# Patient Record
Sex: FEMALE | URBAN - METROPOLITAN AREA
[De-identification: names, ages, dates, MRNs, and addresses within clinical notes are randomized per-mention and may not be internally consistent; named-entity substitution may affect disease eponyms.]

---

## 2020-08-24 ENCOUNTER — TELEPHONE (OUTPATIENT)
Dept: BARIATRICS/WEIGHT MGMT | Age: 60
End: 2020-08-24

## 2020-08-24 NOTE — TELEPHONE ENCOUNTER
Online Info Session Completed:  on 8/21/20 okay to schedule with Dr. Blackman Friend   with [facility]    Patient informed the following: This is NOT a guarantee of payment  When stating that you have a Benefit or Coverage for Bariatric Surgery - that means that you may qualify for the surgery  Bariatric Surgery is considered an elective procedure, patient is responsible to know their benefits . Any information we obtain when calling your insurance  is not  a guarantee of  coverage  and/or  benefit. Appointment Note :   New Patient , [address],   6   month visits,  PG Fee $200,  Mailed Packet or advised to arrive  early      Remind Patient of $200 Program fee with $ 100 required at Second visit with office on initial dietician visit. Remind Patient they must be nicotine free. They will be tested at the beginning of the program and prior to surgery. Advise Patient Responsible for out of pocket, copay at medical visits, Deductible and coinsurance applied to medical visits and procedure. You will be responsible for any of the following:  · Copays   · Deductibles   · Co insurances     The items mentioned above are indicated or required by your insurance plan. Your deductible and coinsurance are applied to medical visits and procedures. Verified with patient if he or she has had any previous bariatric surgery? no  ( If yes ,advise patient of transfer of care process and program fee)       .

## 2023-08-23 PROBLEM — I10 HYPERTENSION: Status: ACTIVE | Noted: 2023-08-23

## 2023-08-23 PROBLEM — E66.9 CLASS 2 OBESITY WITH BODY MASS INDEX (BMI) OF 39.0 TO 39.9 IN ADULT: Status: ACTIVE | Noted: 2023-08-23

## 2023-08-23 PROBLEM — R94.31 ABNORMAL EKG: Status: ACTIVE | Noted: 2023-08-23

## 2023-08-23 PROBLEM — E66.812 CLASS 2 OBESITY WITH BODY MASS INDEX (BMI) OF 39.0 TO 39.9 IN ADULT: Status: ACTIVE | Noted: 2023-08-23

## 2023-08-23 PROBLEM — E78.5 HYPERLIPEMIA: Status: ACTIVE | Noted: 2023-08-23

## 2023-08-23 PROBLEM — R93.1 ECHOCARDIOGRAM ABNORMAL: Status: ACTIVE | Noted: 2023-08-23

## 2023-08-23 RX ORDER — ACETAMINOPHEN 500 MG
1 TABLET ORAL DAILY
COMMUNITY

## 2023-08-23 RX ORDER — LEVOTHYROXINE SODIUM 125 UG/1
1 TABLET ORAL DAILY
COMMUNITY

## 2023-08-23 RX ORDER — LOSARTAN POTASSIUM 50 MG/1
1 TABLET ORAL DAILY
COMMUNITY
Start: 2021-10-05

## 2023-08-23 RX ORDER — CHLORTHALIDONE 25 MG/1
1 TABLET ORAL DAILY
COMMUNITY
Start: 2021-10-05

## 2023-08-23 RX ORDER — ASPIRIN 325 MG
1 TABLET ORAL DAILY
COMMUNITY
End: 2023-10-03

## 2023-08-23 RX ORDER — ROSUVASTATIN CALCIUM 5 MG/1
1 TABLET, COATED ORAL NIGHTLY
COMMUNITY
Start: 2021-10-05

## 2023-08-23 RX ORDER — SPIRONOLACTONE 25 MG/1
1 TABLET ORAL DAILY
COMMUNITY
Start: 2021-10-05

## 2023-10-03 ENCOUNTER — OFFICE VISIT (OUTPATIENT)
Dept: CARDIOLOGY | Facility: CLINIC | Age: 63
End: 2023-10-03
Payer: COMMERCIAL

## 2023-10-03 VITALS
HEIGHT: 66 IN | DIASTOLIC BLOOD PRESSURE: 70 MMHG | SYSTOLIC BLOOD PRESSURE: 130 MMHG | WEIGHT: 240 LBS | HEART RATE: 60 BPM | BODY MASS INDEX: 38.57 KG/M2

## 2023-10-03 DIAGNOSIS — E66.01 CLASS 2 SEVERE OBESITY DUE TO EXCESS CALORIES WITH SERIOUS COMORBIDITY AND BODY MASS INDEX (BMI) OF 39.0 TO 39.9 IN ADULT (MULTI): ICD-10-CM

## 2023-10-03 DIAGNOSIS — I10 PRIMARY HYPERTENSION: ICD-10-CM

## 2023-10-03 DIAGNOSIS — E78.2 MIXED HYPERLIPIDEMIA: Primary | ICD-10-CM

## 2023-10-03 PROCEDURE — 1036F TOBACCO NON-USER: CPT | Performed by: INTERNAL MEDICINE

## 2023-10-03 PROCEDURE — 3008F BODY MASS INDEX DOCD: CPT | Performed by: INTERNAL MEDICINE

## 2023-10-03 PROCEDURE — 99213 OFFICE O/P EST LOW 20 MIN: CPT | Performed by: INTERNAL MEDICINE

## 2023-10-03 PROCEDURE — 3075F SYST BP GE 130 - 139MM HG: CPT | Performed by: INTERNAL MEDICINE

## 2023-10-03 PROCEDURE — 3078F DIAST BP <80 MM HG: CPT | Performed by: INTERNAL MEDICINE

## 2023-10-03 ASSESSMENT — PATIENT HEALTH QUESTIONNAIRE - PHQ9
1. LITTLE INTEREST OR PLEASURE IN DOING THINGS: NEARLY EVERY DAY
5. POOR APPETITE OR OVEREATING: NOT AT ALL
SUM OF ALL RESPONSES TO PHQ9 QUESTIONS 1 AND 2: 6
8. MOVING OR SPEAKING SO SLOWLY THAT OTHER PEOPLE COULD HAVE NOTICED. OR THE OPPOSITE, BEING SO FIGETY OR RESTLESS THAT YOU HAVE BEEN MOVING AROUND A LOT MORE THAN USUAL: NOT AT ALL
SUM OF ALL RESPONSES TO PHQ QUESTIONS 1-9: 6
2. FEELING DOWN, DEPRESSED OR HOPELESS: NEARLY EVERY DAY
4. FEELING TIRED OR HAVING LITTLE ENERGY: NOT AT ALL
6. FEELING BAD ABOUT YOURSELF - OR THAT YOU ARE A FAILURE OR HAVE LET YOURSELF OR YOUR FAMILY DOWN: NOT AT ALL
3. TROUBLE FALLING OR STAYING ASLEEP OR SLEEPING TOO MUCH: NOT AT ALL
9. THOUGHTS THAT YOU WOULD BE BETTER OFF DEAD, OR OF HURTING YOURSELF: NOT AT ALL
7. TROUBLE CONCENTRATING ON THINGS, SUCH AS READING THE NEWSPAPER OR WATCHING TELEVISION: NOT AT ALL
10. IF YOU CHECKED OFF ANY PROBLEMS, HOW DIFFICULT HAVE THESE PROBLEMS MADE IT FOR YOU TO DO YOUR WORK, TAKE CARE OF THINGS AT HOME, OR GET ALONG WITH OTHER PEOPLE: SOMEWHAT DIFFICULT

## 2023-10-03 NOTE — PROGRESS NOTES
Subjective   Birgit Hu is a 63 y.o. female.    Chief Complaint:  Follow-up (1 year)    HPI  Patient returns in follow-up of hypertension.  This was the reason for consultation.  The implementation of an aggressive regimen combining chlorthalidone and spironolactone appear to be effective at restoring normotensive status.  She is also on losartan.  Doing well in this regard.  Recently she stopped taking her statin therapy for unknown reasons.  She had lab work done recently by her PCP and she has an appointment with him tomorrow.  We will defer management of statins and lipids to the PCP    We note that she is on aspirin therapy.  I believe it could be discontinued because she has no diabetes, no vascular disease, no cardiovascular events.  The reason and rationale for this was explained and she understands.    Review of Systems   All other systems reviewed and are negative.      Objective   Constitutional:       Appearance: Healthy appearance. Not in distress.   Neck:      Vascular: No carotid bruit or JVR. JVD normal.   Pulmonary:      Effort: Pulmonary effort is normal.      Breath sounds: Normal breath sounds. No wheezing. No rhonchi. No rales.   Chest:      Chest wall: Not tender to palpatation.   Cardiovascular:      PMI at left midclavicular line. Normal rate. Regular rhythm. Normal S1. Normal S2.       Murmurs: There is no murmur.      No gallop.  No click. No rub.   Pulses:     Intact distal pulses.   Edema:     Peripheral edema absent.   Abdominal:      General: Bowel sounds are normal.      Palpations: Abdomen is soft.      Tenderness: There is no abdominal tenderness.   Musculoskeletal: Normal range of motion.         General: No tenderness. Skin:     General: Skin is warm and dry.   Neurological:      General: No focal deficit present.      Mental Status: Alert and oriented to person, place and time.         Lab Review:   No visits with results within 2 Month(s) from this visit.   Latest known  visit with results is:   No results found for any previous visit.       Assessment/Plan   There were no encounter diagnoses.  1. Mixed hyperlipidemia        2. Primary hypertension        3. Class 2 severe obesity due to excess calories with serious comorbidity and body mass index (BMI) of 39.0 to 39.9 in adult (CMS/[facility])

## 2024-01-25 DIAGNOSIS — I10 PRIMARY HYPERTENSION: ICD-10-CM

## 2024-01-25 RX ORDER — ATENOLOL 25 MG/1
25 TABLET ORAL DAILY
Qty: 90 TABLET | Refills: 3 | Status: SHIPPED | OUTPATIENT
Start: 2024-01-25 | End: 2025-01-24

## 2024-01-25 NOTE — TELEPHONE ENCOUNTER
----- Message from Birgit Hu sent at 1/25/2024  8:18 AM EST -----  Regarding: Dr Sparks wants me to see DR Sampson sooner: High BP  Contact: [phone]  That sounds great, when will the  new Rx be sent to [facility] pharmacy? Thank you!

## 2024-01-25 NOTE — TELEPHONE ENCOUNTER
Spoke with patient via portal. Advised she was in agreement. Rx pended to Dr. Terrence Sampson MD for authorization.

## 2024-02-01 ENCOUNTER — OFFICE VISIT (OUTPATIENT)
Dept: CARDIOLOGY | Facility: CLINIC | Age: 64
End: 2024-02-01
Payer: COMMERCIAL

## 2024-02-01 VITALS
HEIGHT: 66 IN | HEART RATE: 52 BPM | WEIGHT: 233 LBS | BODY MASS INDEX: 37.45 KG/M2 | SYSTOLIC BLOOD PRESSURE: 120 MMHG | DIASTOLIC BLOOD PRESSURE: 60 MMHG

## 2024-02-01 DIAGNOSIS — I10 PRIMARY HYPERTENSION: ICD-10-CM

## 2024-02-01 DIAGNOSIS — E78.2 MIXED HYPERLIPIDEMIA: Primary | ICD-10-CM

## 2024-02-01 DIAGNOSIS — E11.9 TYPE 2 DIABETES MELLITUS WITHOUT COMPLICATION, WITHOUT LONG-TERM CURRENT USE OF INSULIN (MULTI): ICD-10-CM

## 2024-02-01 PROBLEM — E66.9 CLASS 2 OBESITY WITH BODY MASS INDEX (BMI) OF 39.0 TO 39.9 IN ADULT: Status: RESOLVED | Noted: 2023-08-23 | Resolved: 2024-02-01

## 2024-02-01 PROBLEM — E66.812 CLASS 2 OBESITY WITH BODY MASS INDEX (BMI) OF 39.0 TO 39.9 IN ADULT: Status: RESOLVED | Noted: 2023-08-23 | Resolved: 2024-02-01

## 2024-02-01 PROCEDURE — 1036F TOBACCO NON-USER: CPT | Performed by: NURSE PRACTITIONER

## 2024-02-01 PROCEDURE — 4010F ACE/ARB THERAPY RXD/TAKEN: CPT | Performed by: NURSE PRACTITIONER

## 2024-02-01 PROCEDURE — 99212 OFFICE O/P EST SF 10 MIN: CPT | Performed by: NURSE PRACTITIONER

## 2024-02-01 PROCEDURE — 3008F BODY MASS INDEX DOCD: CPT | Performed by: NURSE PRACTITIONER

## 2024-02-01 PROCEDURE — 3074F SYST BP LT 130 MM HG: CPT | Performed by: NURSE PRACTITIONER

## 2024-02-01 PROCEDURE — 3078F DIAST BP <80 MM HG: CPT | Performed by: NURSE PRACTITIONER

## 2024-02-01 NOTE — PROGRESS NOTES
"Subjective:   Birgit Hu is a 63 y.o. female with hypertension.  Patient called into the office approximately 3 weeks ago reporting elevated blood pressure.  Atenolol 25 mg was added to medical regimen.  She has been compliant with changes.  Denies any type of side effects.  She continues to take her blood pressure multiple times throughout the day.  Was educated to take 2 hours after morning medication.  Blood pressure in the office is optimal today.    Otherwise, patient denies any change in overall cardiovascular status since last evaluation in clinic.    Current Outpatient Medications   Medication Sig Dispense Refill    atenolol (Tenormin) 25 mg tablet Take 1 tablet (25 mg) by mouth once daily. 90 tablet 3    chlorthalidone (Hygroton) 25 mg tablet Take 1 tablet (25 mg) by mouth once daily.      levothyroxine (Synthroid) 125 mcg tablet Take 1 tablet (125 mcg) by mouth once daily.      losartan (Cozaar) 50 mg tablet Take 1 tablet (50 mg) by mouth once daily.      melatonin 5 mg tablet Take 1 tablet (5 mg) by mouth once daily.      rosuvastatin (Crestor) 5 mg tablet Take 1 tablet (5 mg) by mouth once daily at bedtime.      spironolactone (Aldactone) 25 mg tablet Take 1 tablet (25 mg) by mouth once daily.       No current facility-administered medications for this visit.      Hypertension ROS: taking medications as instructed, no medication side effects noted, no TIA's, no chest pain on exertion, no dyspnea on exertion, and no swelling of ankles.   New concerns: None.     Objective:   /60 (BP Location: Left arm, Patient Position: Sitting)   Pulse 52   Ht 1.676 m (5' 6\")   Wt 106 kg (233 lb)   BMI 37.61 kg/m²    Appearance alert, well appearing, and in no distress.  General exam BP noted to be well controlled today in office, S1, S2 normal, no gallop, no murmur, chest clear, no JVD, no HSM, no edema.   Lab review: no lab studies available for review at time of visit.     Assessment:    Hypertension " well controlled.     Plan:   Current treatment plan is effective, no change in therapy.  Reviewed diet, exercise and weight control.  Recommended sodium restriction.    As needed follow-up at [facility] as per prior.    Adela White  MSN, APRN-CNP, PMHNP-BC  [facility]    Please excuse any errors in grammar or translation related to this dictation. Voice recognition software was utilized to prepare this document.

## 2024-02-01 NOTE — PATIENT INSTRUCTIONS
Please bring all medicines, vitamins, and herbal supplements with you when you come to the office.    Prescriptions will not be filled unless you are compliant with your follow up appointments or have a follow up appointment scheduled as per instruction of your physician. Refills should be requested at the time of your visit.     PLAN:   Through informed decision making process incorporating patients unique circumstances, the following treatment plan will be initiated:    1.  Prescription drug management of cardiovascular medication for efficacy, adherence to treatment, side effect assessment and polypharmacy. Current treatment clinically warranted and to continue without modifications.    2. Return for follow-up; in the interim, contact the office if new symptoms arise.  [facility] as needed

## 2024-03-14 ENCOUNTER — HOSPITAL ENCOUNTER (OUTPATIENT)
Dept: DIABETES SERVICES | Age: 64
Setting detail: THERAPIES SERIES
Discharge: HOME | End: 2024-03-14
Payer: COMMERCIAL

## 2024-03-14 PROCEDURE — G0108 DIAB MANAGE TRN  PER INDIV: HCPCS

## 2024-03-14 SDOH — ECONOMIC STABILITY: FOOD INSECURITY: ADDITIONAL INFORMATION: YES

## 2024-03-14 ASSESSMENT — PROBLEM AREAS IN DIABETES QUESTIONNAIRE (PAID)
WORRYING ABOUT THE FUTURE AND THE POSSIBILITY OF SERIOUS COMPLICATIONS: 2
FEELING THAT DIABETES IS TAKING UP TOO MUCH OF YOUR MENTAL AND PHYSICAL ENERGY EVERY DAY: 0
FEELING DEPRESSED WHEN YOU THINK ABOUT LIVING WITH DIABETES: 0
PAID-5 TOTAL SCORE: 2
COPING WITH COMPLICATIONS OF DIABETES: 0
FEELING SCARED WHEN YOU THINK ABOUT LIVING WITH DIABETES: 0

## 2024-03-14 ASSESSMENT — SLEEP AND FATIGUE QUESTIONNAIRES
HOW DO YOU RATE THE QUALITY OF YOUR SLEEP: GOOD
HAVE YOU EVER BEEN TESTED FOR SLEEP APNEA: YES
HAVE YOU BEEN TOLD, OR NOTICED ON YOUR OWN, THAT YOU STOP BREATHING OR STRUGGLE TO BREATHE IN YOUR SLEEP: NO
HOW MANY HOURS OF SLEEP ARE YOU GETTING, ON AVERAGE: LESS THAN 7

## 2024-03-14 NOTE — PROGRESS NOTES
Medicine - onset, peak and duration of each type    [] Demonstrates Competency      [] Education provided      [] Proper storage:     [] Demonstrates Competency      [] Education provided          [] Site Management        [] Demonstrates Competency        [] Education provided      [] Injection site currently uses:     [] No evidence of:        [] bruising       [] infection       [] lipoatrophy        [] lipohypertrophy.      [] Not injecting near umbilicus or scars/tattoos    [] Rotates sites appropriately          [] Injection Procedure:       [] Demonstrates Competency        [] Education provided   [] Sterile Technique   [] Rolling to uniformity (if necessary)   [] Pre-injection priming (pen only)   [] Air into vial (Syringe only)   [] Correct order for mixing in same syringe (if necessary)   [] Dosing accuracy   [] Needle insertion   [] Needle withdrawal - with waiting time before removing needle          [] Disposal Procedure:       [] Demonstrates Competency        [] Education provided     [] Injection Device Selection:       [] Demonstrates Competency       [] Education provided        [] Suited to physical limitations           [] Injection adherence:       [] Demonstrates Competency        [] Education provided     [] Misses doses:         [] Daily [] Weekly          [] Monthly [] Almost Never           Nasrin Manzo RN   [] Discussed insulin stigma and proper technique including site selection and self-injection.     []  Reviewed both pen and vial/syringe use. Discussed needle disposal and proper insulin storage and importance of times to take insulin.      [] Discussed importance of blood glucose monitoring to assess current treatment effectiveness. Evaluation rating:  []1  []2  []3  [] 4  []NA [] NC    On Insulin?   []Yes []No           Injection site used: __________     Rotating sites?   []Yes [] No     Problems?   []Yes []No      Storing insulin correctly?   []Yes [] No     Injecting at proper

## 2024-04-03 ENCOUNTER — TELEPHONE (OUTPATIENT)
Dept: DIABETES SERVICES | Age: 64
End: 2024-04-03

## 2024-04-03 NOTE — PROGRESS NOTES
Momixhf2Tcsud- Miguelina         [] SparkQuote (Free, inspiring quote of the day)    []  Healthy Eating  [] Weight Management & Carb Counting  [] Weight Yhnecttd-[phone]; www.weightwaBounce Imaging.IDRI (Infectious Disease Research Institute)  [] Over Eaters Hzvsxhclx-299-367-2664 (support group)- www.[employer].org  [] Follow up individual appointment with [facility] dietitian - call [phone]  [] Food Tracking Apps - My Fitness Pal, Calorie Deepak  [] ADA website - Recipes & Nutrition info, weight loss  [] [employer].com      []   Monitoring  [] Glucose Lalo (Free, tracks blood glucose, graphs)  [] MySugrApp (Basic and Pro patterns)  [] Diabetes:M - logbook    []  Being Active  [] 24 Hour Gkyhfxv-117-324-0240; www.mySupermarket  [] Planet Fitness www.[employer]  [] [facility] [employer] [place] - [phone]  [] Fit Walks: FREE  [facility] in [place] on Mondays 5:30 pm  [facility] [employer] [place] in [place] (for weather)   [facility] on Thursdays at 5:30 [phone]  [] Brionna Ghotra walking videos (Some free on youtube)  [] Other Exercise Plan/Facility _____________________________________________  [] Apps Couch to 5K    []  Reducing Risk  [] Make a sick day toolkit  [] Schedule appointments for eye, dentist  [] Stop smoking              [] Monitor Blood pressure              [] Get vaccinated                [] Other:___________________      []    Taking Medications  [] Miguelina: MyTherapyApp - helps track meds  [] Seek financial help with medications:  [] Non-insulin-agents-cost-saving-resource-7-29-19.pdf   https://www.diabeteseducator.org/docs/default-source/practice/educator-tools/non-insulin-agents-cost-saving-resource-7-29-19.pdf?sfvrsn=2  [] Insulin cost saving resource  https://www.diabeteseducator.org/docs/default-source/practice/educator-tools/insulin-cost-saving-resources-3-4-19.pdf?sfvrsn=4  []  GetInsulin.org  []  The Affordable insulin Project http://affordableinsulinproject.org/    []  Diabetes Websites - Use as a resource

## 2024-07-24 DIAGNOSIS — I10 HYPERTENSION, UNSPECIFIED TYPE: ICD-10-CM

## 2024-07-25 RX ORDER — CHLORTHALIDONE 25 MG/1
25 TABLET ORAL DAILY
Qty: 90 TABLET | Refills: 1 | Status: SHIPPED | OUTPATIENT
Start: 2024-07-25

## 2024-08-13 ENCOUNTER — PATIENT OUTREACH (OUTPATIENT)
Dept: CARDIOLOGY | Facility: CLINIC | Age: 64
End: 2024-08-13
Payer: COMMERCIAL

## 2024-08-13 NOTE — PROGRESS NOTES
Discharge Facility:  [facility]  Discharge Diagnosis:  Symptomatic Bradycardia  Admission Date:  8/8/24  Discharge Date: 8/9/24    PCP Appointment Date: no appt listed  Specialist Appointment Date: Dr Warren 8/26/24--sending task for sooner appt  Hospital Encounter and Summary Linked: Yes    Two attempts were made to reach patient within two business days after discharge. Voicemail left with contact information for patient to call back with any non-emergent questions or concerns.

## 2024-08-14 ENCOUNTER — TELEPHONE (OUTPATIENT)
Dept: CARDIOLOGY | Facility: CLINIC | Age: 64
End: 2024-08-14
Payer: COMMERCIAL

## 2024-08-14 NOTE — TELEPHONE ENCOUNTER
TCM appt has been made with Dr Warren for 8/19 at 11am. Called patient and left message with this appt date and time.  
Communicate Risk of Fall with Harm to all staff/Reinforce activity limits and safety measures with patient and family/Tailored Fall Risk Interventions/Visual Cue: Yellow wristband and red socks/Bed in lowest position, wheels locked, appropriate side rails in place/Call bell, personal items and telephone in reach/Instruct patient to call for assistance before getting out of bed or chair/Non-slip footwear when patient is out of bed/[place] to call system/Physically safe environment - no spills, clutter or unnecessary equipment/Purposeful Proactive Rounding/Room/bathroom lighting operational, light cord in reach

## 2024-08-19 ENCOUNTER — APPOINTMENT (OUTPATIENT)
Dept: CARDIOLOGY | Facility: CLINIC | Age: 64
End: 2024-08-19
Payer: COMMERCIAL

## 2024-08-19 VITALS
WEIGHT: 241 LBS | HEART RATE: 60 BPM | SYSTOLIC BLOOD PRESSURE: 138 MMHG | BODY MASS INDEX: 38.73 KG/M2 | HEIGHT: 66 IN | DIASTOLIC BLOOD PRESSURE: 68 MMHG

## 2024-08-19 DIAGNOSIS — Z79.899 MEDICATION COURSE CHANGED: Primary | ICD-10-CM

## 2024-08-19 DIAGNOSIS — E11.9 DIABETES MELLITUS TYPE II, NON INSULIN DEPENDENT (MULTI): ICD-10-CM

## 2024-08-19 DIAGNOSIS — E78.2 MIXED HYPERLIPIDEMIA: ICD-10-CM

## 2024-08-19 DIAGNOSIS — I10 PRIMARY HYPERTENSION: ICD-10-CM

## 2024-08-19 DIAGNOSIS — Z78.9 NEVER SMOKED CIGARETTES: ICD-10-CM

## 2024-08-19 DIAGNOSIS — E03.9 HYPOTHYROIDISM, UNSPECIFIED TYPE: ICD-10-CM

## 2024-08-19 DIAGNOSIS — Z09 HOSPITAL DISCHARGE FOLLOW-UP: ICD-10-CM

## 2024-08-19 PROCEDURE — 3078F DIAST BP <80 MM HG: CPT | Performed by: INTERNAL MEDICINE

## 2024-08-19 PROCEDURE — 4010F ACE/ARB THERAPY RXD/TAKEN: CPT | Performed by: INTERNAL MEDICINE

## 2024-08-19 PROCEDURE — 3075F SYST BP GE 130 - 139MM HG: CPT | Performed by: INTERNAL MEDICINE

## 2024-08-19 PROCEDURE — 99495 TRANSJ CARE MGMT MOD F2F 14D: CPT | Performed by: INTERNAL MEDICINE

## 2024-08-19 PROCEDURE — 3008F BODY MASS INDEX DOCD: CPT | Performed by: INTERNAL MEDICINE

## 2024-08-19 RX ORDER — ROSUVASTATIN CALCIUM 20 MG/1
20 TABLET, COATED ORAL DAILY
Qty: 90 TABLET | Refills: 3 | Status: SHIPPED | OUTPATIENT
Start: 2024-08-19 | End: 2024-08-21

## 2024-08-19 RX ORDER — GLYBURIDE 2.5 MG/1
2.5 TABLET ORAL DAILY
COMMUNITY
Start: 2024-02-16

## 2024-08-19 RX ORDER — NORETHINDRONE 0.35 MG/1
1 TABLET ORAL DAILY
COMMUNITY
Start: 2024-02-16

## 2024-08-19 RX ORDER — ASPIRIN 81 MG/1
81 TABLET ORAL DAILY
COMMUNITY
Start: 2024-08-08

## 2024-08-19 NOTE — LETTER
August 20, 2024     Aftab Flores DO  [address]  [place]  [place] [address]    Patient: Birgit Hu   YOB: 1960   Date of Visit: 8/19/2024       Dear Dr. Aftab Flores, DO:    Thank you for referring Birgit Hu to me for evaluation. Below are my notes for this consultation.  If you have questions, please do not hesitate to call me. I look forward to following your patient along with you.       Sincerely,     Rachana Warren MD      CC: No Recipients  ______________________________________________________________________________________    Patient is new to this provider.  Previously seen by Dr. Gracia.  I have reviewed his note from October 2023.  Also reviewed notes by Adela White NP dated February 2024.  This is a hospital discharge follow-up.    Subjective :   Presented with profound fatigue shortness of breath and lightheadedness, heart rates were reading in the 40s and 30s, atenolol was discontinued, heart rate has improved and her symptoms have also resolved  She reports feeling presyncopal when she had low heart rates.  Denies chest pressure tightness heaviness or palpitations.  Thyroid issues are handled by Dr. Flores.  In the hospital she was switched from losartan to irbesartan, she felt very fatigued on the irbesartan, she is back on losartan, and her symptoms have improved.  She is a non-insulin-dependent diabetic.  Reviewed hospital notes.        History so Far :  1.  Primary hypertension  2.  Non-insulin-dependent diabetes  3.  Echocardiogram 2017: LVEF 55 to 60%, mild LVH, septal diameter 1.2 cm posterior wall diameter 1.1 cm  4.  Intolerance to atenolol-at 50 mg daily her heart rate dropped into the 30s, and she became fatigued short of breath and presyncopal  5.  Intolerance to irbesartan-fatigue  6.  Intolerance to amlodipine-leg edema  7.  Intolerance to lisinopril-cough  8.  Hypothyroidism  9.  Echocardiogram 2021 January-LVEF  "65% aortic sclerosis, trace tricuspid regurgitation, left atrial diameter 4.1 cm, RV systolic pressure 27 mmHg.      Objective   Wt Readings from Last 3 Encounters:   08/19/24 109 kg (241 lb)   02/01/24 106 kg (233 lb)   10/03/23 109 kg (240 lb)            Vitals:    08/19/24 1058   BP: 138/68   BP Location: Left arm   Patient Position: Sitting   Pulse: 60   Weight: 109 kg (241 lb)   Height: 1.676 m (5' 6\")                Physical Exam:    GENERAL APPEARANCE: in no acute distress.  CHEST: Symmetric and non-tender.  INTEGUMENT: Skin warm and dry  HEENT: No gross abnormalities identified.No pallor or scleral icterus.  NECK: Supple, no JVD, no bruit.   NEURO/PSHCY: Alert and oriented x3; appropriate behavior and responses and responses  LUNGS: Clear to auscultation bilaterally; normal respiratory effort.  HEART: Rate and rhythm regular with no evident murmur; no gallop appreciated.   ABDOMEN: Soft, non tender.  MUSCULOSKELETAL: No gross deformities.  EXTREMITIES: Warm  There is no edema noted.    Meds:  Current Outpatient Medications   Medication Instructions   • aspirin 81 mg, Daily   • chlorthalidone (HYGROTON) 25 mg, oral, Daily   • glyBURIDE (DIABETA) 2.5 mg, Daily   • levothyroxine (Synthroid) 125 mcg tablet 1 tablet, oral, Daily   • losartan (Cozaar) 50 mg tablet 1 tablet, oral, Daily   • norethindrone (Micronor) 0.35 mg tablet 1 tablet, Daily   • rosuvastatin (CRESTOR) 20 mg, oral, Daily          Allergies   Allergen Reactions   • Amlodipine Swelling     Swelling of legs   • Atenolol Other     bradycardia   • Irbesartan Other     bradycardia   • Adhesive Tape-Silicones Unknown   • Lisinopril Cough             LABS:    From 8/8/2024-BNP level 60 sodium 134 potassium 4 BUN 23 creatinine 0.86 hemoglobin 14 hematocrit 41 liver enzymes normal except minimal elevation of total bilirubin at 0.2                 Patient Active Problem List    Diagnosis Date Noted   • Hospital discharge follow-up 08/19/2024   • " Hypothyroidism 08/19/2024   • Never smoked cigarettes 08/19/2024   • BMI 37.0-37.9, adult 02/01/2024   • Diabetes mellitus type II, non insulin dependent (Multi) 02/01/2024   • Abnormal EKG 08/23/2023   • Echocardiogram abnormal 08/23/2023   • Hyperlipemia 08/23/2023   • Hypertension 08/23/2023                 Assessment:    1. Hospital discharge follow-up        2. Primary hypertension  Comprehensive Metabolic Panel    Follow Up In Cardiology    Comprehensive Metabolic Panel      3. Mixed hyperlipidemia  Lipid Panel    rosuvastatin (Crestor) 20 mg tablet    Lipid Panel      4. Diabetes mellitus type II, non insulin dependent (Multi)        5. Hypothyroidism, unspecified type        6. Never smoked cigarettes        Patient's blood pressure is actually controlled on current regimen.  She will work on weight loss and lifestyle modification.  May be a candidate for Ozempic or Wegovy.  She will discuss with Dr. Flores.  We will stay away from AV yuki blocking agents and sinus node inhibitors.  In view of her lipid numbers and underlying diabetes, we will aim for LDL of 70, increasing her rosuvastatin to 20 mg daily, talked about potential side effects and what to do if there occur.    Follow up : 6 months        Provider Attestation - Scribe documentation    All medical record entries made by the Scribe were at my direction and personally dictated by me. I have reviewed the chart and agree that the record accurately reflects my personal performance of the history, physical exam, discussion and plan.

## 2024-08-19 NOTE — PROGRESS NOTES
"Patient is new to this provider.  Previously seen by Dr. Gracia.  I have reviewed his note from October 2023.  Also reviewed notes by Adela White NP dated February 2024.  This is a hospital discharge follow-up.    Subjective :   Presented with profound fatigue shortness of breath and lightheadedness, heart rates were reading in the 40s and 30s, atenolol was discontinued, heart rate has improved and her symptoms have also resolved  She reports feeling presyncopal when she had low heart rates.  Denies chest pressure tightness heaviness or palpitations.  Thyroid issues are handled by Dr. Flores.  In the hospital she was switched from losartan to irbesartan, she felt very fatigued on the irbesartan, she is back on losartan, and her symptoms have improved.  She is a non-insulin-dependent diabetic.  Reviewed hospital notes.        History so Far :  1.  Primary hypertension  2.  Non-insulin-dependent diabetes  3.  Echocardiogram 2017: LVEF 55 to 60%, mild LVH, septal diameter 1.2 cm posterior wall diameter 1.1 cm  4.  Intolerance to atenolol-at 50 mg daily her heart rate dropped into the 30s, and she became fatigued short of breath and presyncopal  5.  Intolerance to irbesartan-fatigue  6.  Intolerance to amlodipine-leg edema  7.  Intolerance to lisinopril-cough  8.  Hypothyroidism  9.  Echocardiogram 2021 January-LVEF 65% aortic sclerosis, trace tricuspid regurgitation, left atrial diameter 4.1 cm, RV systolic pressure 27 mmHg.      Objective   Wt Readings from Last 3 Encounters:   08/19/24 109 kg (241 lb)   02/01/24 106 kg (233 lb)   10/03/23 109 kg (240 lb)            Vitals:    08/19/24 1058   BP: 138/68   BP Location: Left arm   Patient Position: Sitting   Pulse: 60   Weight: 109 kg (241 lb)   Height: 1.676 m (5' 6\")                Physical Exam:    GENERAL APPEARANCE: in no acute distress.  CHEST: Symmetric and non-tender.  INTEGUMENT: Skin warm and dry  HEENT: No gross abnormalities identified.No pallor or " scleral icterus.  NECK: Supple, no JVD, no bruit.   NEURO/PSHCY: Alert and oriented x3; appropriate behavior and responses and responses  LUNGS: Clear to auscultation bilaterally; normal respiratory effort.  HEART: Rate and rhythm regular with no evident murmur; no gallop appreciated.   ABDOMEN: Soft, non tender.  MUSCULOSKELETAL: No gross deformities.  EXTREMITIES: Warm  There is no edema noted.    Meds:  Current Outpatient Medications   Medication Instructions    aspirin 81 mg, Daily    chlorthalidone (HYGROTON) 25 mg, oral, Daily    glyBURIDE (DIABETA) 2.5 mg, Daily    levothyroxine (Synthroid) 125 mcg tablet 1 tablet, oral, Daily    losartan (Cozaar) 50 mg tablet 1 tablet, oral, Daily    norethindrone (Micronor) 0.35 mg tablet 1 tablet, Daily    rosuvastatin (CRESTOR) 20 mg, oral, Daily          Allergies   Allergen Reactions    Amlodipine Swelling     Swelling of legs    Atenolol Other     bradycardia    Irbesartan Other     bradycardia    Adhesive Tape-Silicones Unknown    Lisinopril Cough             LABS:    From 8/8/2024-BNP level 60 sodium 134 potassium 4 BUN 23 creatinine 0.86 hemoglobin 14 hematocrit 41 liver enzymes normal except minimal elevation of total bilirubin at 0.2                 Patient Active Problem List    Diagnosis Date Noted    Hospital discharge follow-up 08/19/2024    Hypothyroidism 08/19/2024    Never smoked cigarettes 08/19/2024    BMI 37.0-37.9, adult 02/01/2024    Diabetes mellitus type II, non insulin dependent (Multi) 02/01/2024    Abnormal EKG 08/23/2023    Echocardiogram abnormal 08/23/2023    Hyperlipemia 08/23/2023    Hypertension 08/23/2023                 Assessment:    1. Hospital discharge follow-up        2. Primary hypertension  Comprehensive Metabolic Panel    Follow Up In Cardiology    Comprehensive Metabolic Panel      3. Mixed hyperlipidemia  Lipid Panel    rosuvastatin (Crestor) 20 mg tablet    Lipid Panel      4. Diabetes mellitus type II, non insulin dependent  (Multi)        5. Hypothyroidism, unspecified type        6. Never smoked cigarettes        Patient's blood pressure is actually controlled on current regimen.  She will work on weight loss and lifestyle modification.  May be a candidate for Ozempic or Wegovy.  She will discuss with Dr. Flores.  We will stay away from AV yuki blocking agents and sinus node inhibitors.  In view of her lipid numbers and underlying diabetes, we will aim for LDL of 70, increasing her rosuvastatin to 20 mg daily, talked about potential side effects and what to do if there occur.    Follow up : 6 months        Provider Attestation - Scribe documentation    All medical record entries made by the Scribe were at my direction and personally dictated by me. I have reviewed the chart and agree that the record accurately reflects my personal performance of the history, physical exam, discussion and plan.

## 2024-08-19 NOTE — PATIENT INSTRUCTIONS
Please bring all medicines, vitamins, and herbal supplements with you when you come to the office.    Prescriptions will not be filled unless you are compliant with your follow up appointments or have a follow up appointment scheduled as per instruction of your physician. Refills should be requested at the time of your visit.     BMI was above normal measurement. Current weight: 109 kg (241 lb)  Weight change since last visit (-) denotes wt loss 8 lbs   Weight loss needed to achieve BMI 25: 86.4 Lbs  Weight loss needed to achieve BMI 30: 55.5 Lbs  Provided instructions on dietary changes  Provided instructions on exercise.

## 2024-08-20 DIAGNOSIS — E78.2 MIXED HYPERLIPIDEMIA: ICD-10-CM

## 2024-08-20 DIAGNOSIS — I10 HYPERTENSION, UNSPECIFIED TYPE: ICD-10-CM

## 2024-08-20 DIAGNOSIS — I10 PRIMARY HYPERTENSION: ICD-10-CM

## 2024-08-21 RX ORDER — CHLORTHALIDONE 25 MG/1
25 TABLET ORAL DAILY
Qty: 90 TABLET | Refills: 3 | Status: SHIPPED | OUTPATIENT
Start: 2024-08-21 | End: 2025-08-21

## 2024-08-21 RX ORDER — ROSUVASTATIN CALCIUM 20 MG/1
20 TABLET, COATED ORAL DAILY
Qty: 90 TABLET | Refills: 3 | Status: SHIPPED | OUTPATIENT
Start: 2024-08-21 | End: 2025-08-21

## 2024-08-21 RX ORDER — LOSARTAN POTASSIUM 50 MG/1
50 TABLET ORAL DAILY
Qty: 90 TABLET | Refills: 3 | Status: SHIPPED | OUTPATIENT
Start: 2024-08-21 | End: 2025-08-21

## 2024-08-26 ENCOUNTER — APPOINTMENT (OUTPATIENT)
Dept: CARDIOLOGY | Facility: CLINIC | Age: 64
End: 2024-08-26
Payer: COMMERCIAL

## 2024-08-27 ENCOUNTER — PATIENT OUTREACH (OUTPATIENT)
Dept: CARDIOLOGY | Facility: CLINIC | Age: 64
End: 2024-08-27
Payer: COMMERCIAL

## 2024-08-27 NOTE — PROGRESS NOTES
Call regarding appt. With Cardiology on 8/19/24 after hospitalization.  At time of outreach call the patient feels as if their condition has improved since last visit.  Reviewed the PCP appointment with the pt and addressed any questions or concerns.

## 2024-09-26 ENCOUNTER — PATIENT OUTREACH (OUTPATIENT)
Dept: CARDIOLOGY | Facility: CLINIC | Age: 64
End: 2024-09-26
Payer: COMMERCIAL

## 2024-10-08 ENCOUNTER — APPOINTMENT (OUTPATIENT)
Dept: CARDIOLOGY | Facility: CLINIC | Age: 64
End: 2024-10-08
Payer: COMMERCIAL

## 2024-10-09 ENCOUNTER — APPOINTMENT (OUTPATIENT)
Dept: CARDIOLOGY | Facility: CLINIC | Age: 64
End: 2024-10-09
Payer: COMMERCIAL

## 2024-10-15 ENCOUNTER — APPOINTMENT (OUTPATIENT)
Dept: CARDIOLOGY | Facility: CLINIC | Age: 64
End: 2024-10-15
Payer: COMMERCIAL

## 2024-10-16 ENCOUNTER — APPOINTMENT (OUTPATIENT)
Dept: CARDIOLOGY | Facility: CLINIC | Age: 64
End: 2024-10-16
Payer: COMMERCIAL

## 2024-10-24 ENCOUNTER — PATIENT OUTREACH (OUTPATIENT)
Dept: PRIMARY CARE | Facility: CLINIC | Age: 64
End: 2024-10-24
Payer: COMMERCIAL

## 2025-02-20 ENCOUNTER — APPOINTMENT (OUTPATIENT)
Dept: CARDIOLOGY | Facility: CLINIC | Age: 65
End: 2025-02-20
Payer: COMMERCIAL

## 2025-03-10 ENCOUNTER — APPOINTMENT (OUTPATIENT)
Dept: CARDIOLOGY | Facility: CLINIC | Age: 65
End: 2025-03-10
Payer: COMMERCIAL

## 2025-03-10 VITALS
DIASTOLIC BLOOD PRESSURE: 60 MMHG | BODY MASS INDEX: 34.72 KG/M2 | SYSTOLIC BLOOD PRESSURE: 116 MMHG | HEIGHT: 66 IN | HEART RATE: 60 BPM | WEIGHT: 216 LBS

## 2025-03-10 DIAGNOSIS — E03.9 HYPOTHYROIDISM, UNSPECIFIED TYPE: ICD-10-CM

## 2025-03-10 DIAGNOSIS — Z78.9 NEVER SMOKED CIGARETTES: ICD-10-CM

## 2025-03-10 DIAGNOSIS — E78.2 MIXED HYPERLIPIDEMIA: ICD-10-CM

## 2025-03-10 DIAGNOSIS — E11.9 DIABETES MELLITUS TYPE II, NON INSULIN DEPENDENT (MULTI): ICD-10-CM

## 2025-03-10 DIAGNOSIS — Z79.899 MEDICATION COURSE CHANGED: ICD-10-CM

## 2025-03-10 DIAGNOSIS — I10 PRIMARY HYPERTENSION: ICD-10-CM

## 2025-03-10 PROCEDURE — 3074F SYST BP LT 130 MM HG: CPT | Performed by: INTERNAL MEDICINE

## 2025-03-10 PROCEDURE — 3078F DIAST BP <80 MM HG: CPT | Performed by: INTERNAL MEDICINE

## 2025-03-10 PROCEDURE — 3008F BODY MASS INDEX DOCD: CPT | Performed by: INTERNAL MEDICINE

## 2025-03-10 PROCEDURE — 99214 OFFICE O/P EST MOD 30 MIN: CPT | Performed by: INTERNAL MEDICINE

## 2025-03-10 PROCEDURE — 1036F TOBACCO NON-USER: CPT | Performed by: INTERNAL MEDICINE

## 2025-03-10 PROCEDURE — 4010F ACE/ARB THERAPY RXD/TAKEN: CPT | Performed by: INTERNAL MEDICINE

## 2025-03-10 RX ORDER — PRAVASTATIN SODIUM 20 MG/1
20 TABLET ORAL DAILY
Qty: 90 TABLET | Refills: 3 | Status: SHIPPED | OUTPATIENT
Start: 2025-03-10 | End: 2026-03-10

## 2025-03-10 RX ORDER — LOSARTAN POTASSIUM 50 MG/1
50 TABLET ORAL DAILY
Qty: 90 TABLET | Refills: 3 | Status: SHIPPED | OUTPATIENT
Start: 2025-03-10 | End: 2026-03-10

## 2025-03-10 RX ORDER — CHLORTHALIDONE 25 MG/1
25 TABLET ORAL DAILY
Qty: 90 TABLET | Refills: 3 | Status: SHIPPED | OUTPATIENT
Start: 2025-03-10 | End: 2026-03-10

## 2025-03-10 NOTE — PROGRESS NOTES
"64-year-old with multiple cardiac risk factors, no established coronary artery disease, several medication intolerances, presents for follow-up.    Subjective :     Review of Systems interval review of systems is negative for chest discomfort pressure tightness heaviness palpitations lightheadedness orthopnea paroxysmal nocturnal dyspnea dependent edema or claudication TIA or CVA type symptoms or bleeding diathesis  Developed muscle aches on current dose of rosuvastatin.  She was able to tolerate 5 mg dose without issue.  12 point ROS is negative or non contributory except as noted.       History so Far :  1.  Primary hypertension  2.  Non-insulin-dependent diabetes  3.  Echocardiogram 2017: LVEF 55 to 60%, mild LVH, septal diameter 1.2 cm posterior wall diameter 1.1 cm  4.  Intolerance to atenolol-at 50 mg daily her heart rate dropped into the 30s, and she became fatigued short of breath and presyncopal  5.  Intolerance to irbesartan-fatigue  6.  Intolerance to amlodipine-leg edema  7.  Intolerance to lisinopril-cough  8.  Hypothyroidism  9.  Echocardiogram 2021 January-LVEF 65% aortic sclerosis, trace tricuspid regurgitation, left atrial diameter 4.1 cm, RV systolic pressure 27 mmHg.  10.coronary calcium score November 20 22-16, ascending thoracic aorta 3.7 cm in diameter    Objective   Wt Readings from Last 3 Encounters:   03/10/25 98 kg (216 lb)   08/19/24 109 kg (241 lb)   02/01/24 106 kg (233 lb)            Vitals:    03/10/25 1522   BP: 116/60   BP Location: Left arm   Patient Position: Sitting   Pulse: 60   Weight: 98 kg (216 lb)   Height: 1.676 m (5' 6\")                Physical Exam:    GENERAL APPEARANCE: in no acute distress.  CHEST: Symmetric and non-tender.  INTEGUMENT: Skin warm and dry  HEENT: No gross abnormalities identified.No pallor or scleral icterus.  NECK: Supple, no JVD, no bruit.   NEURO/PSHCY: Alert and oriented x3; appropriate behavior and responses and responses  LUNGS: Clear to " "auscultation bilaterally; normal respiratory effort.  HEART: Rate and rhythm regular with no evident murmur; no gallop appreciated.   ABDOMEN: Soft, non tender.  MUSCULOSKELETAL: No gross deformities.  EXTREMITIES: Warm  There is no edema noted.    Meds:  Current Outpatient Medications   Medication Instructions    aspirin 81 mg, Daily    chlorthalidone (HYGROTON) 25 mg, oral, Daily    glyBURIDE (DIABETA) 2.5 mg, Daily    levothyroxine (Synthroid) 125 mcg tablet 1 tablet, oral, Daily    losartan (COZAAR) 50 mg, oral, Daily    norethindrone (Micronor) 0.35 mg tablet 1 tablet, Daily          Allergies   Allergen Reactions    Amlodipine Swelling     Swelling of legs    Atenolol Other     bradycardia    Irbesartan Other     bradycardia    Rosuvastatin Myalgia     Able to take lower dose    Adhesive Tape-Silicones Unknown    Lisinopril Cough             LABS:    Lipids:  No results found for: \"CHOL\"  No results found for: \"HDL\"  No results found for: \"LDLCALC\"  No results found for: \"TRIG\"  No components found for: \"CHOLHDL\"  A1C  No results found for: \"HGBA1C\"  CBC        October 2024-GFR greater than 60 sodium 138 potassium 3.5 liver enzymes normal total cholesterol 139 HDL 34 triglycerides 155 LDL 74 total cholesterol to HDL ratio 4.1 TSH 3.84 hemoglobin and hematocrit 13.4 and 39.5 respectively          Patient Active Problem List    Diagnosis Date Noted    Medication course changed 03/10/2025    Hospital discharge follow-up 08/19/2024    Hypothyroidism 08/19/2024    Never smoked cigarettes 08/19/2024    Body mass index (BMI) of 34.0 to 34.9 in adult 02/01/2024    Diabetes mellitus type II, non insulin dependent (Multi) 02/01/2024    Abnormal EKG 08/23/2023    Echocardiogram abnormal 08/23/2023    Hyperlipemia 08/23/2023    Hypertension 08/23/2023                 Assessment:    1. Primary hypertension  Follow Up In Cardiology      2. Mixed hyperlipidemia        3. Diabetes mellitus type II, non insulin dependent " (Multi)        4. Hypothyroidism, unspecified type        5. Medication course changed        6. Never smoked cigarettes        7. Body mass index (BMI) of 34.0 to 34.9 in adult           Patient blood pressure is controlled on current regimen, and she feels well on this combination of medications  She has intolerance to doses of rosuvastatin higher than 5 mg.  Recent lipid profile was reviewed.  Although her coronary calcium score is less than 100, she is at the 75th percentile for age.  Will discontinue rosuvastatin and  start pravastatin 20 mg daily  Comprehensive profile and lipid profile in 3 to 4 months  Patient to call with interval issues or concerns.  Follow up : 1 year        Provider Attestation - Latia CHANG LPN Scribe documentation    All medical record entries made by the Scribe were at my direction and personally dictated by me. I have reviewed the chart and agree that the record accurately reflects my personal performance of the history, physical exam, discussion and plan.

## 2025-03-10 NOTE — PATIENT INSTRUCTIONS
Please bring all medicines, vitamins, and herbal supplements with you when you come to the office.    Prescriptions will not be filled unless you are compliant with your follow up appointments or have a follow up appointment scheduled as per instruction of your physician. Refills should be requested at the time of your visit.     BMI was above normal measurement. Current weight: 98 kg (216 lb)  Weight change since last visit (-) denotes wt loss -25 lbs   Weight loss needed to achieve BMI 25: 61.4 Lbs  Weight loss needed to achieve BMI 30: 30.5 Lbs  Provided instructions on dietary changes  Provided instructions on exercise.

## 2025-03-10 NOTE — LETTER
March 10, 2025     Aftab Flores,   [address]  [place]  [place] [address]    Patient: Birgit Hu   YOB: 1960   Date of Visit: 3/10/2025       Dear Dr. Aftab Flores, DO:    Thank you for referring Birgit Hu to me for evaluation. Below are my notes for this consultation.  If you have questions, please do not hesitate to call me. I look forward to following your patient along with you.       Sincerely,     Rachana Warren MD      CC: No Recipients  ______________________________________________________________________________________    64-year-old with multiple cardiac risk factors, no established coronary artery disease, several medication intolerances, presents for follow-up.    Subjective :     Review of Systems interval review of systems is negative for chest discomfort pressure tightness heaviness palpitations lightheadedness orthopnea paroxysmal nocturnal dyspnea dependent edema or claudication TIA or CVA type symptoms or bleeding diathesis  Developed muscle aches on current dose of rosuvastatin.  She was able to tolerate 5 mg dose without issue.  12 point ROS is negative or non contributory except as noted.       History so Far :  1.  Primary hypertension  2.  Non-insulin-dependent diabetes  3.  Echocardiogram 2017: LVEF 55 to 60%, mild LVH, septal diameter 1.2 cm posterior wall diameter 1.1 cm  4.  Intolerance to atenolol-at 50 mg daily her heart rate dropped into the 30s, and she became fatigued short of breath and presyncopal  5.  Intolerance to irbesartan-fatigue  6.  Intolerance to amlodipine-leg edema  7.  Intolerance to lisinopril-cough  8.  Hypothyroidism  9.  Echocardiogram 2021 January-LVEF 65% aortic sclerosis, trace tricuspid regurgitation, left atrial diameter 4.1 cm, RV systolic pressure 27 mmHg.  10.coronary calcium score November 20 22-16, ascending thoracic aorta 3.7 cm in diameter    Objective   Wt Readings from Last 3  "Encounters:   03/10/25 98 kg (216 lb)   08/19/24 109 kg (241 lb)   02/01/24 106 kg (233 lb)            Vitals:    03/10/25 1522   BP: 116/60   BP Location: Left arm   Patient Position: Sitting   Pulse: 60   Weight: 98 kg (216 lb)   Height: 1.676 m (5' 6\")                Physical Exam:    GENERAL APPEARANCE: in no acute distress.  CHEST: Symmetric and non-tender.  INTEGUMENT: Skin warm and dry  HEENT: No gross abnormalities identified.No pallor or scleral icterus.  NECK: Supple, no JVD, no bruit.   NEURO/PSHCY: Alert and oriented x3; appropriate behavior and responses and responses  LUNGS: Clear to auscultation bilaterally; normal respiratory effort.  HEART: Rate and rhythm regular with no evident murmur; no gallop appreciated.   ABDOMEN: Soft, non tender.  MUSCULOSKELETAL: No gross deformities.  EXTREMITIES: Warm  There is no edema noted.    Meds:  Current Outpatient Medications   Medication Instructions   • aspirin 81 mg, Daily   • chlorthalidone (HYGROTON) 25 mg, oral, Daily   • glyBURIDE (DIABETA) 2.5 mg, Daily   • levothyroxine (Synthroid) 125 mcg tablet 1 tablet, oral, Daily   • losartan (COZAAR) 50 mg, oral, Daily   • norethindrone (Micronor) 0.35 mg tablet 1 tablet, Daily          Allergies   Allergen Reactions   • Amlodipine Swelling     Swelling of legs   • Atenolol Other     bradycardia   • Irbesartan Other     bradycardia   • Rosuvastatin Myalgia     Able to take lower dose   • Adhesive Tape-Silicones Unknown   • Lisinopril Cough             LABS:    Lipids:  No results found for: \"CHOL\"  No results found for: \"HDL\"  No results found for: \"LDLCALC\"  No results found for: \"TRIG\"  No components found for: \"CHOLHDL\"  A1C  No results found for: \"HGBA1C\"  CBC        October 2024-GFR greater than 60 sodium 138 potassium 3.5 liver enzymes normal total cholesterol 139 HDL 34 triglycerides 155 LDL 74 total cholesterol to HDL ratio 4.1 TSH 3.84 hemoglobin and hematocrit 13.4 and 39.5 respectively          Patient " Active Problem List    Diagnosis Date Noted   • Medication course changed 03/10/2025   • Hospital discharge follow-up 08/19/2024   • Hypothyroidism 08/19/2024   • Never smoked cigarettes 08/19/2024   • Body mass index (BMI) of 34.0 to 34.9 in adult 02/01/2024   • Diabetes mellitus type II, non insulin dependent (Multi) 02/01/2024   • Abnormal EKG 08/23/2023   • Echocardiogram abnormal 08/23/2023   • Hyperlipemia 08/23/2023   • Hypertension 08/23/2023                 Assessment:    1. Primary hypertension  Follow Up In Cardiology      2. Mixed hyperlipidemia        3. Diabetes mellitus type II, non insulin dependent (Multi)        4. Hypothyroidism, unspecified type        5. Medication course changed        6. Never smoked cigarettes        7. Body mass index (BMI) of 34.0 to 34.9 in adult           Patient blood pressure is controlled on current regimen, and she feels well on this combination of medications  She has intolerance to doses of rosuvastatin higher than 5 mg.  Recent lipid profile was reviewed.  Although her coronary calcium score is less than 100, she is at the 75th percentile for age.  Will discontinue rosuvastatin and  start pravastatin 20 mg daily  Comprehensive profile and lipid profile in 3 to 4 months  Patient to call with interval issues or concerns.  Follow up : 1 year        Provider Attestation - Latia Merazibe documentation    All medical record entries made by the Scribe were at my direction and personally dictated by me. I have reviewed the chart and agree that the record accurately reflects my personal performance of the history, physical exam, discussion and plan.

## 2026-03-09 ENCOUNTER — APPOINTMENT (OUTPATIENT)
Dept: CARDIOLOGY | Facility: CLINIC | Age: 66
End: 2026-03-09
Payer: COMMERCIAL